# Patient Record
Sex: FEMALE | Race: BLACK OR AFRICAN AMERICAN | NOT HISPANIC OR LATINO | Employment: OTHER | URBAN - METROPOLITAN AREA
[De-identification: names, ages, dates, MRNs, and addresses within clinical notes are randomized per-mention and may not be internally consistent; named-entity substitution may affect disease eponyms.]

---

## 2020-08-20 ENCOUNTER — OFFICE VISIT (OUTPATIENT)
Dept: URBAN - METROPOLITAN AREA CLINIC 27 | Facility: CLINIC | Age: 85
End: 2020-08-20

## 2020-08-20 PROBLEM — 83132003 UPPER ABDOMINAL PAIN: Status: ACTIVE | Noted: 2020-08-20

## 2020-08-26 ENCOUNTER — OFFICE VISIT (OUTPATIENT)
Dept: URBAN - METROPOLITAN AREA SURGERY CENTER 7 | Facility: SURGERY CENTER | Age: 85
End: 2020-08-26

## 2020-09-22 ENCOUNTER — OFFICE VISIT (OUTPATIENT)
Dept: URBAN - METROPOLITAN AREA CLINIC 27 | Facility: CLINIC | Age: 85
End: 2020-09-22

## 2021-11-03 ENCOUNTER — OFFICE VISIT (OUTPATIENT)
Dept: URBAN - METROPOLITAN AREA CLINIC 27 | Facility: CLINIC | Age: 86
End: 2021-11-03

## 2021-11-04 ENCOUNTER — OFFICE VISIT (OUTPATIENT)
Dept: URBAN - METROPOLITAN AREA CLINIC 121 | Facility: CLINIC | Age: 86
End: 2021-11-04

## 2022-04-30 ENCOUNTER — TELEPHONE ENCOUNTER (OUTPATIENT)
Dept: URBAN - METROPOLITAN AREA CLINIC 121 | Facility: CLINIC | Age: 87
End: 2022-04-30

## 2022-04-30 RX ORDER — ESOMEPRAZOLE MAGNESIUM 40 MG
1 PO BID CAPSULE,DELAYED RELEASE (ENTERIC COATED) ORAL
OUTPATIENT
Start: 2008-03-17 | End: 2020-08-20

## 2022-04-30 RX ORDER — NIZATIDINE 150 MG/1
1 CAP BID CAPSULE ORAL
OUTPATIENT
Start: 2006-01-09 | End: 2009-07-24

## 2022-04-30 RX ORDER — OMEPRAZOLE 40 MG/1
TAKE 1 CAPSULE BY MOUTH ONCE A DAY TAKE 1 CAPSULE DAILY CAPSULE, DELAYED RELEASE ORAL
OUTPATIENT
Start: 2021-11-03 | End: 2021-11-03

## 2022-04-30 RX ORDER — OMEPRAZOLE MAGNESIUM 10 MG/1
GRANULE, DELAYED RELEASE ORAL
OUTPATIENT
Start: 2020-08-20

## 2022-04-30 RX ORDER — BISOPROLOL FUMARATE AND HYDROCHLOROTHIAZIDE 2.5; 6.25 MG/1; MG/1
QD TABLET, FILM COATED ORAL
OUTPATIENT
Start: 2016-02-04

## 2022-04-30 RX ORDER — OMEPRAZOLE 40 MG/1
CAPSULE, DELAYED RELEASE ORAL
OUTPATIENT
End: 2021-11-04

## 2022-04-30 RX ORDER — OMEPRAZOLE 40 MG/1
TAKE ONE CAPSULE BY MOUTH ONCE DAILY CAPSULE, DELAYED RELEASE ORAL
OUTPATIENT
Start: 2020-09-22

## 2022-04-30 RX ORDER — BISMUTH SUBSALICYLATE 525 MG/1
TABLET ORAL
OUTPATIENT
Start: 2020-08-20

## 2022-04-30 RX ORDER — KRILL/OM-3/DHA/EPA/PHOSPHO/AST 1000-230MG
CAPSULE ORAL
OUTPATIENT
Start: 2009-07-24

## 2022-04-30 RX ORDER — OMEPRAZOLE MAGNESIUM 10 MG/1
GRANULE, DELAYED RELEASE ORAL
OUTPATIENT
Start: 2020-08-20 | End: 2021-11-04

## 2022-04-30 RX ORDER — SUCRALFATE 1 G/1
TAKE 1 TABLET BY MOUTH ONCE A DAY TABLET ORAL
OUTPATIENT
Start: 2020-08-26 | End: 2021-11-12

## 2022-04-30 RX ORDER — NIZATIDINE 150 MG/1
1 CAP BID CAPSULE ORAL
OUTPATIENT
Start: 2006-01-09

## 2022-04-30 RX ORDER — ESOMEPRAZOLE MAGNESIUM 40 MG
1 PO QD CAPSULE,DELAYED RELEASE (ENTERIC COATED) ORAL
OUTPATIENT
Start: 2010-04-26

## 2022-04-30 RX ORDER — ESOMEPRAZOLE MAGNESIUM 40 MG
1 PO BID CAPSULE,DELAYED RELEASE (ENTERIC COATED) ORAL
OUTPATIENT
Start: 2008-03-17

## 2022-04-30 RX ORDER — OMEPRAZOLE 40 MG/1
TAKE 1 CAPSULE BY MOUTH 30 MINS BEFORE BREAKFAST CAPSULE, DELAYED RELEASE ORAL
OUTPATIENT
Start: 2021-11-04 | End: 2022-03-04

## 2022-04-30 RX ORDER — OMEPRAZOLE 40 MG/1
TAKE ONE CAPSULE BY MOUTH ONCE DAILY CAPSULE, DELAYED RELEASE ORAL
OUTPATIENT
Start: 2020-09-22 | End: 2021-11-03

## 2022-04-30 RX ORDER — CALCITONIN,SALMON,SYNTHETIC 200/SPRAY
AEROSOL, SPRAY WITH PUMP (ML) NASAL
OUTPATIENT
Start: 2007-07-05 | End: 2009-07-24

## 2022-04-30 RX ORDER — PRAVASTATIN SODIUM 20 MG/1
TABLET ORAL
OUTPATIENT
Start: 2016-02-04 | End: 2018-04-05

## 2022-04-30 RX ORDER — OMEPRAZOLE 40 MG/1
TAKE 1 CAPSULE BY MOUTH ONCE A DAY TAKE 1 CAPSULE DAILY CAPSULE, DELAYED RELEASE ORAL
OUTPATIENT
Start: 2021-11-03 | End: 2022-03-03

## 2022-04-30 RX ORDER — ASPIRIN 81 MG/1
QD TABLET ORAL
OUTPATIENT
Start: 2016-02-04

## 2022-04-30 RX ORDER — ESOMEPRAZOLE MAGNESIUM 40 MG
1 PO QD CAPSULE,DELAYED RELEASE (ENTERIC COATED) ORAL
OUTPATIENT
Start: 2010-04-26 | End: 2016-02-04

## 2022-04-30 RX ORDER — PRAVASTATIN SODIUM 20 MG/1
TABLET ORAL
OUTPATIENT
Start: 2016-02-04

## 2022-04-30 RX ORDER — ESOMEPRAZOLE MAGNESIUM 40 MG
1 PO BID CAPSULE,DELAYED RELEASE (ENTERIC COATED) ORAL
OUTPATIENT
Start: 2009-01-07 | End: 2009-07-24

## 2022-04-30 RX ORDER — CALCITONIN,SALMON,SYNTHETIC 200/SPRAY
AEROSOL, SPRAY WITH PUMP (ML) NASAL
OUTPATIENT
Start: 2007-07-05

## 2022-04-30 RX ORDER — ESOMEPRAZOLE MAGNESIUM 40 MG
1 PO BID CAPSULE,DELAYED RELEASE (ENTERIC COATED) ORAL
OUTPATIENT
Start: 2009-01-07

## 2022-04-30 RX ORDER — ESOMEPRAZOLE MAGNESIUM 40 MG
1 PO QD CAPSULE,DELAYED RELEASE (ENTERIC COATED) ORAL
OUTPATIENT
Start: 2007-08-09

## 2022-04-30 RX ORDER — OMEPRAZOLE 40 MG/1
TAKE 1 CAPSULE DAILY CAPSULE, DELAYED RELEASE ORAL
OUTPATIENT
Start: 2021-09-29 | End: 2021-11-03

## 2022-04-30 RX ORDER — SUCRALFATE 1 G/1
DISSOLVE 1 TABLET IN APPLESAUCE OR WATER, TAKE PO QAC TABLET ORAL
OUTPATIENT
Start: 2020-08-26

## 2022-05-01 ENCOUNTER — TELEPHONE ENCOUNTER (OUTPATIENT)
Dept: URBAN - METROPOLITAN AREA CLINIC 121 | Facility: CLINIC | Age: 87
End: 2022-05-01

## 2022-05-01 RX ORDER — SUCRALFATE 1 G/1
DISSOLVE 1 TABLET IN APPLESAUCE OR WATER AND TAKE BEFORE EVERY MEAL TABLET ORAL
Status: ACTIVE | COMMUNITY
Start: 2021-11-12

## 2022-05-01 RX ORDER — BISMUTH SUBSALICYLATE 525 MG/1
TABLET ORAL
Status: ACTIVE | COMMUNITY
Start: 2020-08-20

## 2022-05-01 RX ORDER — DOXYCYCLINE HYCLATE 50 MG/1
TABLET, FILM COATED ORAL
Status: ACTIVE | COMMUNITY

## 2022-05-01 RX ORDER — BISOPROLOL FUMARATE AND HYDROCHLOROTHIAZIDE 2.5; 6.25 MG/1; MG/1
ONCE A DAY TABLET, FILM COATED ORAL
Status: ACTIVE | COMMUNITY
Start: 2016-02-04

## 2022-05-01 RX ORDER — ASPIRIN 81 MG/1
ONCE A DAY TABLET ORAL
Status: ACTIVE | COMMUNITY
Start: 2016-02-04

## 2022-09-29 ENCOUNTER — ERX REFILL RESPONSE (OUTPATIENT)
Dept: URBAN - METROPOLITAN AREA CLINIC 27 | Facility: CLINIC | Age: 87
End: 2022-09-29

## 2022-09-29 RX ORDER — SUCRALFATE 1 G/1
DISSOLVE 1 TABLET IN APPLESAUCE OR WATER AND TAKE BEFORE EVERY MEAL TABLET ORAL
Qty: 270 TABLET | Refills: 3 | OUTPATIENT

## 2022-09-29 RX ORDER — SUCRALFATE 1 G/1
DISSOLVE 1 TABLET IN APPLESAUCE OR WATER AND TAKE BEFORE EVERY MEAL TABLET ORAL
Qty: 270 TABLET | Refills: 4 | OUTPATIENT

## 2022-09-29 RX ORDER — OMEPRAZOLE 40 MG/1
TAKE 1 CAPSULE 30 MINUTES BEFORE BREAKFAST CAPSULE, DELAYED RELEASE PELLETS ORAL
Qty: 90 CAPSULE | Refills: 4 | OUTPATIENT

## 2022-09-29 RX ORDER — OMEPRAZOLE 40 MG/1
TAKE 1 CAPSULE 30 MINUTES BEFORE BREAKFAST CAPSULE, DELAYED RELEASE PELLETS ORAL
Qty: 90 CAPSULE | Refills: 3 | OUTPATIENT

## 2023-08-10 ENCOUNTER — ERX REFILL RESPONSE (OUTPATIENT)
Dept: URBAN - METROPOLITAN AREA CLINIC 27 | Facility: CLINIC | Age: 88
End: 2023-08-10

## 2023-08-10 RX ORDER — OMEPRAZOLE 40 MG/1
TAKE 1 CAPSULE 30 MINUTES BEFORE BREAKFAST CAPSULE, DELAYED RELEASE PELLETS ORAL
Qty: 90 CAPSULE | Refills: 4 | OUTPATIENT

## 2023-08-10 RX ORDER — OMEPRAZOLE 40 MG/1
TAKE 1 CAPSULE 30 MINUTES BEFORE BREAKFAST CAPSULE, DELAYED RELEASE PELLETS ORAL
Qty: 90 CAPSULE | Refills: 3 | OUTPATIENT

## 2023-08-23 ENCOUNTER — TELEPHONE ENCOUNTER (OUTPATIENT)
Dept: URBAN - METROPOLITAN AREA CLINIC 27 | Facility: CLINIC | Age: 88
End: 2023-08-23

## 2023-08-23 RX ORDER — SUCRALFATE 1 G/1
DISSOLVE 1 TABLET IN APPLESAUCE OR WATER, TAKE PO QAC TABLET ORAL THREE TIMES A DAY
Qty: 90 | Refills: 3
Start: 2020-08-26 | End: 2023-12-21

## 2023-08-30 ENCOUNTER — TELEPHONE ENCOUNTER (OUTPATIENT)
Dept: URBAN - METROPOLITAN AREA CLINIC 27 | Facility: CLINIC | Age: 88
End: 2023-08-30

## 2023-08-30 RX ORDER — SUCRALFATE 1 G/1
DISSOLVE 1 TABLET IN APPLESAUCE OR WATER, TAKE PO QAC TABLET ORAL THREE TIMES A DAY
Qty: 90 | Refills: 3
Start: 2020-08-26 | End: 2023-12-29

## 2023-09-12 ENCOUNTER — TELEPHONE ENCOUNTER (OUTPATIENT)
Dept: URBAN - METROPOLITAN AREA CLINIC 27 | Facility: CLINIC | Age: 88
End: 2023-09-12

## 2023-09-12 RX ORDER — SUCRALFATE 1 G/1
DISSOLVE 1 TABLET IN APPLESAUCE OR WATER, TAKE PO QAC TABLET ORAL THREE TIMES A DAY
Qty: 270 | Refills: 3
Start: 2020-08-26 | End: 2024-09-06

## 2024-05-06 ENCOUNTER — TELEPHONE ENCOUNTER (OUTPATIENT)
Dept: URBAN - METROPOLITAN AREA CLINIC 27 | Facility: CLINIC | Age: 89
End: 2024-05-06

## 2024-05-06 RX ORDER — OMEPRAZOLE 40 MG/1
ONE CAPSULE CAPSULE, DELAYED RELEASE PELLETS ORAL
Qty: 90 | Refills: 3

## 2024-09-06 ENCOUNTER — ERX REFILL RESPONSE (OUTPATIENT)
Dept: URBAN - METROPOLITAN AREA CLINIC 27 | Facility: CLINIC | Age: 89
End: 2024-09-06

## 2024-09-06 RX ORDER — SUCRALFATE 1 G/1
DISSOLVE 1 TABLET IN APPLESAUCE OR WATER, TAKE THREE TIMES A DAY BEFORE EVERY MEAL TABLET ORAL
Qty: 270 TABLET | Refills: 4 | OUTPATIENT

## 2024-09-06 RX ORDER — SUCRALFATE 1 G/1
DISSOLVE ONE TABLET IN 15ML WATER TABLET ORAL
Qty: 270 | Refills: 3 | OUTPATIENT

## 2025-02-20 ENCOUNTER — DASHBOARD ENCOUNTERS (OUTPATIENT)
Age: OVER 89
End: 2025-02-20

## 2025-02-20 ENCOUNTER — OFFICE VISIT (OUTPATIENT)
Dept: URBAN - METROPOLITAN AREA CLINIC 27 | Facility: CLINIC | Age: OVER 89
End: 2025-02-20
Payer: MEDICARE

## 2025-02-20 VITALS
BODY MASS INDEX: 17.85 KG/M2 | DIASTOLIC BLOOD PRESSURE: 79 MMHG | HEART RATE: 77 BPM | WEIGHT: 97 LBS | HEIGHT: 62 IN | SYSTOLIC BLOOD PRESSURE: 194 MMHG

## 2025-02-20 DIAGNOSIS — R11.10 REGURGITATION OF STOMACH CONTENTS: ICD-10-CM

## 2025-02-20 DIAGNOSIS — K21.9 GERD: ICD-10-CM

## 2025-02-20 PROCEDURE — 99202 OFFICE O/P NEW SF 15 MIN: CPT | Performed by: INTERNAL MEDICINE

## 2025-02-20 RX ORDER — OMEPRAZOLE 40 MG/1
ONE CAPSULE CAPSULE, DELAYED RELEASE PELLETS ORAL
Qty: 90 | Refills: 3 | Status: ON HOLD | COMMUNITY

## 2025-02-20 RX ORDER — BISMUTH SUBSALICYLATE 525 MG/1
TABLET ORAL
Status: ACTIVE | COMMUNITY
Start: 2020-08-20

## 2025-02-20 RX ORDER — ASPIRIN 81 MG/1
ONCE A DAY TABLET ORAL
Status: ACTIVE | COMMUNITY
Start: 2016-02-04

## 2025-02-20 RX ORDER — SUCRALFATE 1 G/1
DISSOLVE ONE TABLET IN 15ML WATER TABLET ORAL
Qty: 270 | Refills: 3 | Status: ACTIVE | COMMUNITY

## 2025-02-20 RX ORDER — DOXYCYCLINE HYCLATE 50 MG/1
TABLET, FILM COATED ORAL
Status: ON HOLD | COMMUNITY

## 2025-02-20 RX ORDER — BISOPROLOL FUMARATE AND HYDROCHLOROTHIAZIDE 2.5; 6.25 MG/1; MG/1
ONCE A DAY TABLET, FILM COATED ORAL
Status: ACTIVE | COMMUNITY
Start: 2016-02-04

## 2025-02-20 NOTE — HPI-TODAY'S VISIT:
Patient here with complaints of worsening reflux symptoms over the past few months. She is currently taking Prilosec 40 mg once daily and a Carafate tablet dissolved in water once daily; both are taken before breakfast. She seems to be mostly adherent to antireflux regimen. She has breakthrough symptoms approximately 3 times per week which typically occur in the afternoon. She does not take anything for this. She will sometimes "regurgitate acid" in the evening. She does take a probiotic. She has no abdominal pain and her stools are regular. She has lost a few pounds since last visit. She states that recent labs were normal.   She last underwent upper endoscopy in 2020 which revealed mild nonerosive gastritis and mild duodenitis. Small bowel biopsies were normal, and gastric biopsies were negative for H pylori. She was started on carafate slurry and was asked to continue Prilosec, with resolution of her abdominal pain. There is no family history of colon cancer or polyps. Her last colonoscopy was in 2016; two benign polyps were removed. She does not use NSAIDs.

## 2025-05-07 ENCOUNTER — ERX REFILL RESPONSE (OUTPATIENT)
Dept: URBAN - METROPOLITAN AREA CLINIC 27 | Facility: CLINIC | Age: OVER 89
End: 2025-05-07

## 2025-05-07 RX ORDER — OMEPRAZOLE 40 MG/1
TAKE 1 CAPSULE 30 MINUTES BEFORE BREAKFAST CAPSULE, DELAYED RELEASE PELLETS ORAL
Qty: 90 CAPSULE | Refills: 3